# Patient Record
Sex: MALE | Race: WHITE | Employment: STUDENT | URBAN - METROPOLITAN AREA
[De-identification: names, ages, dates, MRNs, and addresses within clinical notes are randomized per-mention and may not be internally consistent; named-entity substitution may affect disease eponyms.]

---
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
## 2023-01-01 LAB
FLUAV + FLUBV RNA SPEC NAA+PROBE: NOT DETECTED
FLUAV + FLUBV RNA SPEC NAA+PROBE: NOT DETECTED
RSV RNA SPEC NAA+PROBE: NOT DETECTED
SARS-COV-2 RNA RESP QL NAA+PROBE: NOT DETECTED

## 2023-01-02 ENCOUNTER — APPOINTMENT (OUTPATIENT)
Dept: GENERAL RADIOLOGY | Facility: HOSPITAL | Age: 18
End: 2023-01-02
Payer: COMMERCIAL

## 2023-01-02 ENCOUNTER — HOSPITAL ENCOUNTER (EMERGENCY)
Facility: HOSPITAL | Age: 18
Discharge: HOME | End: 2023-01-02
Attending: EMERGENCY MEDICINE
Payer: COMMERCIAL

## 2023-01-02 VITALS
SYSTOLIC BLOOD PRESSURE: 136 MMHG | RESPIRATION RATE: 22 BRPM | OXYGEN SATURATION: 98 % | HEART RATE: 101 BPM | DIASTOLIC BLOOD PRESSURE: 87 MMHG | WEIGHT: 174 LBS | TEMPERATURE: 99 F | HEIGHT: 71 IN | BODY MASS INDEX: 24.36 KG/M2

## 2023-01-02 DIAGNOSIS — J18.9 COMMUNITY ACQUIRED PNEUMONIA OF LEFT LOWER LOBE OF LUNG: Primary | ICD-10-CM

## 2023-01-02 PROCEDURE — 71045 X-RAY EXAM CHEST 1 VIEW: CPT

## 2023-01-02 PROCEDURE — 99283 EMERGENCY DEPT VISIT LOW MDM: CPT

## 2023-01-02 PROCEDURE — 99284 EMERGENCY DEPT VISIT MOD MDM: CPT

## 2023-01-02 RX ORDER — PSEUDOEPHEDRINE HCL 120 MG/1
120 TABLET, FILM COATED, EXTENDED RELEASE ORAL EVERY 12 HOURS PRN
Qty: 20 TABLET | Refills: 0 | Status: SHIPPED | OUTPATIENT
Start: 2023-01-02 | End: 2023-01-12

## 2023-01-02 RX ORDER — BENZOCAINE AND DEXTROMETHORPHAN HYDROBROMIDE 7.5; 5 MG/1; MG/1
1 LOZENGE ORAL
Qty: 20 LOZENGE | Refills: 0 | Status: SHIPPED | OUTPATIENT
Start: 2023-01-02 | End: 2023-01-09

## 2023-01-02 RX ORDER — DOXYCYCLINE HYCLATE 100 MG/1
100 CAPSULE ORAL 2 TIMES DAILY
Qty: 20 CAPSULE | Refills: 0 | Status: SHIPPED | OUTPATIENT
Start: 2023-01-02 | End: 2023-01-12

## 2023-01-12 ENCOUNTER — OFFICE VISIT (OUTPATIENT)
Dept: INTERNAL MEDICINE CLINIC | Facility: CLINIC | Age: 18
End: 2023-01-12
Payer: COMMERCIAL

## 2023-01-12 VITALS
WEIGHT: 174 LBS | HEIGHT: 71 IN | BODY MASS INDEX: 24.36 KG/M2 | SYSTOLIC BLOOD PRESSURE: 118 MMHG | RESPIRATION RATE: 14 BRPM | HEART RATE: 67 BPM | OXYGEN SATURATION: 98 % | DIASTOLIC BLOOD PRESSURE: 72 MMHG | TEMPERATURE: 98 F

## 2023-01-12 DIAGNOSIS — Z00.00 HEALTH MAINTENANCE EXAMINATION: Primary | ICD-10-CM

## 2023-01-12 DIAGNOSIS — J18.9 PNEUMONIA DUE TO INFECTIOUS ORGANISM, UNSPECIFIED LATERALITY, UNSPECIFIED PART OF LUNG: ICD-10-CM

## 2023-01-12 PROBLEM — Z00.129 ENCOUNTER FOR ROUTINE CHILD HEALTH EXAMINATION WITHOUT ABNORMAL FINDINGS: Status: RESOLVED | Noted: 2017-10-09 | Resolved: 2023-01-12

## 2023-01-12 NOTE — PATIENT INSTRUCTIONS
PATIENT INSTRUCTIONS    Thank you for seeing me today, it was a pleasure taking care of you. Please check out at the  and schedule a follow up appointment. Return in about 1 year (around 1/12/2024) for physical.  Please get your labs drawn - you may need to schedule a lab appointment if this was not completed at your recent doctor's visit. The following imaging studies were ordered: Repeat chest x-ray  Please also follow up with the following specialists: None  Please fill out the advance directive information (power of  documents) and bring a copy to our clinic.   Continue healthy diet and exercise  Finish course of antibiotics   Consider COVID vaccine and flu vaccine      Dr. Nick Lee Prom

## 2023-01-12 NOTE — ASSESSMENT & PLAN NOTE
Exercise and diet advised. CBC, CMP, lipid panel, TSH, HIV screen, hepatitis C screen  Flu shot declined  Advanced directive information provided. Advised COVID vaccine.

## 2023-01-12 NOTE — ASSESSMENT & PLAN NOTE
Recent pneumonia. Now feeling much better. Lungs are clear to auscultation. Will order repeat chest x-ray to re-evaluate.

## 2023-01-13 ENCOUNTER — HOSPITAL ENCOUNTER (OUTPATIENT)
Dept: GENERAL RADIOLOGY | Age: 18
Discharge: HOME | End: 2023-01-13
Attending: FAMILY MEDICINE
Payer: COMMERCIAL

## 2023-01-13 DIAGNOSIS — J18.9 PNEUMONIA DUE TO INFECTIOUS ORGANISM, UNSPECIFIED LATERALITY, UNSPECIFIED PART OF LUNG: ICD-10-CM

## 2023-01-13 LAB
ABSOLUTE BASOPHILS: 78 CELLS/UL (ref 0–200)
ABSOLUTE EOSINOPHILS: 151 CELLS/UL (ref 15–500)
ABSOLUTE LYMPHOCYTES: 2094 CELLS/UL (ref 850–3900)
ABSOLUTE MONOCYTES: 622 CELLS/UL (ref 200–950)
ABSOLUTE NEUTROPHILS: 2654 CELLS/UL (ref 1500–7800)
ALBUMIN/GLOBULIN RATIO: 1.8 (CALC) (ref 1–2.5)
ALBUMIN: 4.8 G/DL (ref 3.6–5.1)
ALKALINE PHOSPHATASE: 63 U/L (ref 46–169)
ALT: 128 U/L (ref 8–46)
AST: 78 U/L (ref 12–32)
BASOPHILS: 1.4 %
BILIRUBIN, TOTAL: 0.4 MG/DL (ref 0.2–1.1)
BUN: 15 MG/DL (ref 7–20)
CALCIUM: 10.3 MG/DL (ref 8.9–10.4)
CARBON DIOXIDE: 27 MMOL/L (ref 20–32)
CHLORIDE: 103 MMOL/L (ref 98–110)
CHOL/HDLC RATIO: 5 (CALC)
CHOLESTEROL, TOTAL: 139 MG/DL
CREATININE: 0.89 MG/DL (ref 0.6–1.24)
EGFR: 127 ML/MIN/1.73M2
EOSINOPHILS: 2.7 %
GLOBULIN: 2.7 G/DL (CALC) (ref 2.1–3.5)
GLUCOSE: 92 MG/DL (ref 65–99)
HDL CHOLESTEROL: 28 MG/DL
HEMATOCRIT: 49.8 % (ref 38.5–50)
HEMOGLOBIN: 17 G/DL (ref 13.2–17.1)
LDL-CHOLESTEROL: 85 MG/DL (CALC)
LYMPHOCYTES: 37.4 %
MCH: 28.9 PG (ref 27–33)
MCHC: 34.1 G/DL (ref 32–36)
MCV: 84.7 FL (ref 80–100)
MONOCYTES: 11.1 %
MPV: 9.7 FL (ref 7.5–12.5)
NEUTROPHILS: 47.4 %
NON-HDL CHOLESTEROL: 111 MG/DL (CALC)
PLATELET COUNT: 489 THOUSAND/UL (ref 140–400)
POTASSIUM: 4.5 MMOL/L (ref 3.8–5.1)
PROTEIN, TOTAL: 7.5 G/DL (ref 6.3–8.2)
RDW: 12.1 % (ref 11–15)
RED BLOOD CELL COUNT: 5.88 MILLION/UL (ref 4.2–5.8)
SIGNAL TO CUT-OFF: 0.11
SODIUM: 142 MMOL/L (ref 135–146)
TRIGLYCERIDES: 162 MG/DL
TSH W/REFLEX TO FT4: 1.64 MIU/L (ref 0.5–4.3)
WHITE BLOOD CELL COUNT: 5.6 THOUSAND/UL (ref 3.8–10.8)

## 2023-01-13 PROCEDURE — 71046 X-RAY EXAM CHEST 2 VIEWS: CPT | Performed by: FAMILY MEDICINE

## 2023-01-14 PROBLEM — R79.89 ELEVATED PLATELET COUNT: Status: ACTIVE | Noted: 2023-01-14

## 2023-01-14 PROBLEM — R74.8 ELEVATED LIVER ENZYMES: Status: ACTIVE | Noted: 2023-01-14

## 2024-05-27 NOTE — ED INITIAL ASSESSMENT (HPI)
Pt to ED with c/o persistent productive cough, headache, and diarrhea since Wednesday. Negative Gen expert at [facility] on 12/31/2022. No respiratory distress noted. Pt is alert and oriented x4. Pt skin parameters WNL. Pt ambulating by self with steady gait. deconditioning, weakness, inability/difficulty to ambulate

## 2024-07-18 ENCOUNTER — OFFICE VISIT (OUTPATIENT)
Dept: DERMATOLOGY CLINIC | Facility: CLINIC | Age: 19
End: 2024-07-18

## 2024-07-18 DIAGNOSIS — D49.2 NEOPLASM OF UNSPECIFIED BEHAVIOR OF BONE, SOFT TISSUE, AND SKIN: ICD-10-CM

## 2024-07-18 DIAGNOSIS — D22.9 MULTIPLE BENIGN NEVI: Primary | ICD-10-CM

## 2024-07-18 PROCEDURE — 11103 TANGNTL BX SKIN EA SEP/ADDL: CPT | Performed by: STUDENT IN AN ORGANIZED HEALTH CARE EDUCATION/TRAINING PROGRAM

## 2024-07-18 PROCEDURE — 99203 OFFICE O/P NEW LOW 30 MIN: CPT | Performed by: STUDENT IN AN ORGANIZED HEALTH CARE EDUCATION/TRAINING PROGRAM

## 2024-07-18 PROCEDURE — 11102 TANGNTL BX SKIN SINGLE LES: CPT | Performed by: STUDENT IN AN ORGANIZED HEALTH CARE EDUCATION/TRAINING PROGRAM

## 2024-07-18 NOTE — PROGRESS NOTES
New Patient    Referred by: No referring provider defined for this encounter.    CHIEF COMPLAINT: Moles of concern     HISTORY OF PRESENT ILLNESS: Reggie Hughes is a 18 year old male here for evaluation of lesion of concern.    1. Moles  Location: chest, stomach, and arms   Duration: since birth   Signs and symptoms: bothersome and raised; would like them removed     Personal Dermatologic History  History of skin cancer: No  History of  atypical moles: No    FAMILY HISTORY:  History of melanoma: No    Past Medical History  Past Medical History:    Closed fracture of styloid process of right ulna    Closed metaphyseal torus fracture of distal radius       REVIEW OF SYSTEMS:  Constitutional: Denies fever, chills, unintentional weight loss.   Skin as per HPI    Medications  No current outpatient medications on file.       PHYSICAL EXAM:  General: awake, alert, no acute distress  Neuropsych: appropriate mood and affect  Eyes: Sclerae anicteric, without conjunctival injection, eyelids unremarkable  Skin: Skin exam was performed today including the following: Upper extremities and abdomen. Pertinent findings include:   -With regular appearing brown macules  - Lower abdomen with 2 brown papules    ASSESSMENT & PLAN:  Pathophysiology of diagnoses discussed with patient.  Therapeutic options reviewed. Risks, benefits, and alternatives discussed with patient. Instructions reviewed at length.    #Multiple benign nevi  - Reassured patient of benign nature of these lesions.   - Return for lesions that are new, growing, changing or symptomatic.   - Recommend daily photoprotection with broad-spectrum sunscreen (SPF 30 daily with reapplication every 2 hours), avoidance of sun during peak hours, and sun protective clothing.   - Dermoscopy was used for physical examination of pigmented lesions during today's office visit.    #Neoplasm(s) of uncertain behavior of skin  - Shave biopsy performed today   - Will notify patient with results  and arrange for appropriate definitive treatment, if indicated.      Shave of lesion to establish and confirm diagnosis:  Photo taken: Yes    Risks, benefits, alternatives and personnel required for shave biopsy reviewed with patient. Risks discussed include, but not limited to: pain, bleeding, infection, scar, reaction to anesthetic, and recurrence/need for further treatment.  Patient and physician agree as to site(s) to be biopsied. Patient verbalizes understanding and wishes to proceed.     Site(s) prepped with alcohol and anesthetized with 1% lidocaine with epinephrine.   Shave of lesion(s) performed to the level of the dermis. Specimen(s) from A.L abdomen, B. R abdomen  sent for pathology to r/o Nevus 50% ALCL and bandaging applied.   Written and verbal wound care instructions provided to patient, understanding verbalized.      Return to clinic: yearly or sooner if something concerning arises     Kyle Carney MD

## (undated) NOTE — LETTER
VACCINE ADMINISTRATION RECORD  PARENT / GUARDIAN APPROVAL  Date: 10/9/2017  Vaccine administered to:  Ike Beck     : 2005    MRN: [id number]    A copy of the appropriate Centers for Disease Control and Prevention Vaccine Information statement ha

## (undated) NOTE — LETTER
VACCINE ADMINISTRATION RECORD  PARENT / GUARDIAN APPROVAL  Date: 10/9/2017  Vaccine administered to:  Lucy Key     : 2005    MRN: [id number]    A copy of the appropriate Centers for Disease Control and Prevention Vaccine Information statement ha

## (undated) NOTE — LETTER
Date & Time: 1/30/2020, 3:06 PM  Patient: Betty Kim  Encounter Provider(s):    Jenifer Oglesby MD       To Whom It May Concern:    Betty Kim was seen and treated in our department on 1/30/2020. He is excused from gym class for 2 weeks. .    If you h

## (undated) NOTE — LETTER
[facility] [employer] of Child Health Examination       Student's Name  Gil Palma Birth Date Title                           Date     Signature HEALTH HISTORY          TO BE COMPLETED AND SIGNED BY PARENT/GUARDIAN AND VERIFIED BY HEALTH CARE PROVIDER    ALLERGIES  (Food, drug, insect, other)  Patient has no known allergies.  MEDICATION  (List all prescribed or taken on a regular basis.)  No current /78   Pulse 79   Ht 5' 6.5\" (1.689 m)   Wt 53.5 kg (118 lb)   BMI 18.76 kg/m²     DIABETES SCREENING  BMI>85% age/sex  No And any two of the following:  Family History No    Ethnic Minority  No          Signs of Insulin Resistance (hypertension, dys Currently Prescribed Asthma Medication:            Quick-relief  medication (e.g. Short Acting Beta Antagonist): No          Controller medication (e.g. inhaled corticosteroid):   No Other   NEEDS/MODIFICATIONS required in the school setting  None DIET

## (undated) NOTE — LETTER
[place], Do  [address] [facility] [address]       10/28/19        Patient: Tianna Merritt   YOB: 2005   Date of Visit: 10/28/2019       Dear  Dr. Angela Jama,      Thank you for referring Tianna Merritt to my practic

## (undated) NOTE — MR AVS SNAPSHOT
[address] [address] [address]  [phone]               Thank you for choosing us for your health care visit with [facility], [place].   We are glad to serve you and happy to provide you with this summary of your vis infected by bacteria. Severe, untreated tonsillitis in children or adults can cause a pocket of pus (abscess) to form near the tonsil. Your evaluation  A medical evaluation can help find the cause of your sore throat.  It can also help your healthcare prov · Drink warm liquids to soothe the throat and help thin mucus. Avoid alcohol, spicy foods, and acidic drinks such as orange juice. These can irritate the throat. · Gargle with warm saltwater (1 teaspoon of salt to 8 ounces of warm water).   · Use a humidif · Symptoms worsen, or new symptoms develop. · Swollen tonsils make breathing difficult. · The pain is severe enough to keep you from drinking liquids. · A skin rash, hives, or wheezing develops.  Any of these could signal an allergic reaction to antibiot often. Sometimes toddlers need to try a food 10 times before they actually accept and enjoy it. It is also important to encourage play time as soon as they start crawling and walking.  As your children grow, continue to help them live a healthy active lifes

## (undated) NOTE — LETTER
[facility] [employer] [facility] [employer] of Child Health Examination       Student's Name  Pablo Evans Birth Date 6/25/2023   Signature                                                                                                                                              Title                           Date    (If adding dates to the above immunization history se insect, other)  Patient has no known allergies. MEDICATION  (List all prescribed or taken on a regular basis.)  No current outpatient medications on file. Diagnosis of asthma?   Child wakes during the night coughing   Yes   No    Yes   No    Loss of funct following:  Family History No    Ethnic Minority  No          Signs of Insulin Resistance (hypertension, dyslipidemia, polycystic ovarian syndrome, acanthosis nigricans)    No           At Risk  No   Lead Risk Questionnaire  Req'd for children 6 months thr inhaled corticosteroid):   No Other   NEEDS/MODIFICATIONS required in the school setting  None DIETARY Needs/Restrictions     None   SPECIAL INSTRUCTIONS/DEVICES e.g. safety glasses, glass eye, chest protector for arrhythmia, pacemaker, prosthetic device,

## (undated) NOTE — LETTER
Date: 2/11/2020    Patient Name: Earnest Carney          To Whom it may concern: This letter has been written at the patient's request. The above patient was seen at the [facility] for treatment of a medical condition.     This patient should

## (undated) NOTE — LETTER
[facility] [employer] of Child Health Examination       Student's Name  Tari Montelongo Birth Date Title    DO                       Date  6/20/2019   Signature                                                                                                                                              Title HEALTH HISTORY          TO BE COMPLETED AND SIGNED BY PARENT/GUARDIAN AND VERIFIED BY HEALTH CARE PROVIDER    ALLERGIES  (Food, drug, insect, other)  Patient has no known allergies.  MEDICATION  (List all prescribed or taken on a regular basis.)  No current /74   Pulse 77   Ht 5' 9\" (1.753 m)   Wt 60.3 kg (133 lb)   BMI 19.64 kg/m²     DIABETES SCREENING  BMI>85% age/sex  No And any two of the following:  Family History No    Ethnic Minority  No          Signs of Insulin Resistance (hypertension, dysli Currently Prescribed Asthma Medication:            Quick-relief  medication (e.g. Short Acting Beta Antagonist): No          Controller medication (e.g. inhaled corticosteroid):   No Other   NEEDS/MODIFICATIONS required in the school setting  None DIET

## (undated) NOTE — LETTER
Name:  Ha Ignacio Year:  10th Grade Class: Student ID No.:   Address:  [address] [place] [address] Phone:  [phone] (home) [phone] (work) :  11year old   Name Relationship [address] Work Phone Home Phone Mobile Phone   1 13. Does anyone in your family have a heart problem, pacemaker, or implanted defibrillator? 12. Has anyone in your family had unexplained fainting, seizures, or near drowning?      BONE AND JOINT QUESTIONS Yes No   17. Have you ever had an injury to a b after being hit or falling? 39.Have you ever been unable to move your arms / legs after being hit /fall? 40. Have you ever become ill while exercising in the heat?     41. Do you get frequent muscle cramps when exercising? 42.  Do you or someone · Murmurs (auscultation standing, supine, +/- Valsalva)  · Location of point of maximal impulse (PMI) Yes    Pulses Yes    Lungs Yes    Abdomen Yes    Genitourinary (males only)* Yes    Skin:  HSV, lesions suggestive of MRSA, tinea corporis Yes    Neurolog Protocol.  We have reviewed the policy and understand that I/our student may be asked to submit to testing for the presence of performance-enhancing substances in my/his/her body either during [employer] state series events or during the school day, and I/our abi

## (undated) NOTE — LETTER
VACCINE ADMINISTRATION RECORD  PARENT / GUARDIAN APPROVAL  Date: 2021  Vaccine administered to:  Dakota Goodson     : 2005    MRN: [id number]    A copy of the appropriate Centers for Disease Control and Prevention Vaccine Information statement ha

## (undated) NOTE — LETTER
Name:  Jeremie Sen Year:  12th Grade Class: Student ID No.:   Address:  [address] [facility] [address] Phone:  [phone] (home) [phone] (work) :  14year old   Name Relationship [address] Work [email or web address] Phone   1 pacemaker, or implanted defibrillator? 12. Has anyone in your family had unexplained fainting, seizures, or near drowning?      BONE AND JOINT QUESTIONS Yes No   17. Have you ever had an injury to a bone, muscle, ligament, or tendon that caused you to m to move your arms / legs after being hit /fall? 40. Have you ever become ill while exercising in the heat?     41. Do you get frequent muscle cramps when exercising? 42. Do you or someone in your family have sickle cell trait or disease? 43.  Chery Moralez (PMI) Yes    Pulses Yes    Lungs Yes    Abdomen Yes    Genitourinary (males only)* Yes    Skin:  HSV, lesions suggestive of MRSA, tinea corporis Yes    Neurologic* Yes    MUSCULOSKELETAL     Neck Yes    Back Yes    Shoulder/arm Yes    Elbow/forearm Yes in my/his/her body either during [employer] state series events or during the school day, and I/our student do/does hereby agree to submit to such testing and analysis by a certified laboratory.  We further understand and agree that the results of the performance